# Patient Record
Sex: OTHER/UNKNOWN | URBAN - METROPOLITAN AREA
[De-identification: names, ages, dates, MRNs, and addresses within clinical notes are randomized per-mention and may not be internally consistent; named-entity substitution may affect disease eponyms.]

---
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
## 2024-01-13 ENCOUNTER — APPOINTMENT (EMERGENCY)
Dept: RADIOLOGY | Facility: HOSPITAL | Age: 19
End: 2024-01-13
Payer: COMMERCIAL

## 2024-01-13 ENCOUNTER — HOSPITAL ENCOUNTER (EMERGENCY)
Facility: HOSPITAL | Age: 19
Discharge: HOME | End: 2024-01-13
Attending: EMERGENCY MEDICINE
Payer: COMMERCIAL

## 2024-01-13 VITALS
TEMPERATURE: 98 F | BODY MASS INDEX: 20.76 KG/M2 | RESPIRATION RATE: 18 BRPM | WEIGHT: 137 LBS | SYSTOLIC BLOOD PRESSURE: 117 MMHG | HEART RATE: 74 BPM | OXYGEN SATURATION: 96 % | HEIGHT: 68 IN | DIASTOLIC BLOOD PRESSURE: 58 MMHG

## 2024-01-13 DIAGNOSIS — R50.9 FEVER, UNSPECIFIED FEVER CAUSE: ICD-10-CM

## 2024-01-13 DIAGNOSIS — J18.9 PNEUMONIA DUE TO INFECTIOUS ORGANISM, UNSPECIFIED LATERALITY, UNSPECIFIED PART OF LUNG: Primary | ICD-10-CM

## 2024-01-13 LAB
ALBUMIN SERPL-MCNC: 4.2 G/DL (ref 3.5–5.7)
ALP SERPL-CCNC: 44 IU/L (ref 34–125)
ALT SERPL-CCNC: 11 IU/L (ref 7–52)
ANION GAP SERPL CALC-SCNC: 11 MEQ/L (ref 3–15)
AST SERPL-CCNC: 22 IU/L (ref 13–39)
BASOPHILS # BLD: 0.04 K/UL (ref 0.01–0.1)
BASOPHILS NFR BLD: 0.5 %
BILIRUB SERPL-MCNC: 0.7 MG/DL (ref 0.3–1.2)
BUN SERPL-MCNC: 18 MG/DL (ref 7–25)
CALCIUM SERPL-MCNC: 9.5 MG/DL (ref 8.6–10.3)
CHLORIDE SERPL-SCNC: 102 MEQ/L (ref 98–107)
CO2 SERPL-SCNC: 22 MEQ/L (ref 21–31)
CREAT SERPL-MCNC: 1.4 MG/DL (ref 0.7–1.3)
DIFFERENTIAL METHOD BLD: ABNORMAL
EGFRCR SERPLBLD CKD-EPI 2021: >60 ML/MIN/1.73M*2
EOSINOPHIL # BLD: 0.01 K/UL (ref 0.04–0.54)
EOSINOPHIL NFR BLD: 0.1 %
ERYTHROCYTE [DISTWIDTH] IN BLOOD BY AUTOMATED COUNT: 11.3 % (ref 11.6–14.4)
FLUAV RNA SPEC QL NAA+PROBE: NEGATIVE
FLUBV RNA SPEC QL NAA+PROBE: NEGATIVE
GLUCOSE SERPL-MCNC: 109 MG/DL (ref 70–99)
HCT VFR BLDCO AUTO: 45.4 % (ref 40.1–51)
HGB BLD-MCNC: 15.7 G/DL (ref 13.7–17.5)
IMM GRANULOCYTES # BLD AUTO: 0.05 K/UL (ref 0–0.08)
IMM GRANULOCYTES NFR BLD AUTO: 0.6 %
LIPASE SERPL-CCNC: 66 U/L (ref 11–82)
LYMPHOCYTES # BLD: 1.04 K/UL (ref 1.2–3.5)
LYMPHOCYTES NFR BLD: 13.5 %
MCH RBC QN AUTO: 30.5 PG (ref 28–33.2)
MCHC RBC AUTO-ENTMCNC: 34.6 G/DL (ref 32.2–36.5)
MCV RBC AUTO: 88.3 FL (ref 83–98)
MONOCYTES # BLD: 0.82 K/UL (ref 0.3–1)
MONOCYTES NFR BLD: 10.6 %
NEUTROPHILS # BLD: 5.76 K/UL (ref 1.7–7)
NEUTS SEG NFR BLD: 74.7 %
NRBC BLD-RTO: 0 %
PDW BLD AUTO: 10.4 FL (ref 9.4–12.4)
PLATELET # BLD AUTO: 187 K/UL (ref 150–350)
POTASSIUM SERPL-SCNC: 4.5 MEQ/L (ref 3.5–5.1)
PROT SERPL-MCNC: 8.2 G/DL (ref 6–8.2)
RBC # BLD AUTO: 5.14 M/UL (ref 4.5–5.8)
RSV RNA SPEC QL NAA+PROBE: NEGATIVE
SARS-COV-2 RNA RESP QL NAA+PROBE: NEGATIVE
SODIUM SERPL-SCNC: 135 MEQ/L (ref 136–145)
WBC # BLD AUTO: 7.72 K/UL (ref 3.8–10.5)

## 2024-01-13 PROCEDURE — 96365 THER/PROPH/DIAG IV INF INIT: CPT | Mod: 59

## 2024-01-13 PROCEDURE — 85025 COMPLETE CBC W/AUTO DIFF WBC: CPT | Performed by: EMERGENCY MEDICINE

## 2024-01-13 PROCEDURE — 63700000 HC SELF-ADMINISTRABLE DRUG: Performed by: PHYSICIAN ASSISTANT

## 2024-01-13 PROCEDURE — 80053 COMPREHEN METABOLIC PANEL: CPT | Performed by: EMERGENCY MEDICINE

## 2024-01-13 PROCEDURE — 25800000 HC PHARMACY IV SOLUTIONS: Performed by: PHYSICIAN ASSISTANT

## 2024-01-13 PROCEDURE — 3E0337Z INTRODUCTION OF ELECTROLYTIC AND WATER BALANCE SUBSTANCE INTO PERIPHERAL VEIN, PERCUTANEOUS APPROACH: ICD-10-PCS | Performed by: EMERGENCY MEDICINE

## 2024-01-13 PROCEDURE — 83690 ASSAY OF LIPASE: CPT | Performed by: EMERGENCY MEDICINE

## 2024-01-13 PROCEDURE — 99284 EMERGENCY DEPT VISIT MOD MDM: CPT | Mod: 25,U5

## 2024-01-13 PROCEDURE — 3E033GC INTRODUCTION OF OTHER THERAPEUTIC SUBSTANCE INTO PERIPHERAL VEIN, PERCUTANEOUS APPROACH: ICD-10-PCS | Performed by: EMERGENCY MEDICINE

## 2024-01-13 PROCEDURE — 36415 COLL VENOUS BLD VENIPUNCTURE: CPT | Performed by: EMERGENCY MEDICINE

## 2024-01-13 PROCEDURE — 63600105 HC IODINE BASED CONTRAST: Mod: JZ | Performed by: PHYSICIAN ASSISTANT

## 2024-01-13 PROCEDURE — 96361 HYDRATE IV INFUSION ADD-ON: CPT | Mod: 59

## 2024-01-13 PROCEDURE — 3E03329 INTRODUCTION OF OTHER ANTI-INFECTIVE INTO PERIPHERAL VEIN, PERCUTANEOUS APPROACH: ICD-10-PCS | Performed by: EMERGENCY MEDICINE

## 2024-01-13 PROCEDURE — 96375 TX/PRO/DX INJ NEW DRUG ADDON: CPT | Mod: 59

## 2024-01-13 PROCEDURE — 63600000 HC DRUGS/DETAIL CODE: Mod: JZ | Performed by: PHYSICIAN ASSISTANT

## 2024-01-13 PROCEDURE — 63700000 HC SELF-ADMINISTRABLE DRUG

## 2024-01-13 PROCEDURE — 63700000 HC SELF-ADMINISTRABLE DRUG: Performed by: EMERGENCY MEDICINE

## 2024-01-13 PROCEDURE — G1004 CDSM NDSC: HCPCS

## 2024-01-13 PROCEDURE — 87637 SARSCOV2&INF A&B&RSV AMP PRB: CPT | Performed by: EMERGENCY MEDICINE

## 2024-01-13 RX ORDER — CEFUROXIME AXETIL 500 MG/1
500 TABLET ORAL 2 TIMES DAILY
Qty: 14 TABLET | Refills: 0 | Status: SHIPPED | OUTPATIENT
Start: 2024-01-13 | End: 2024-01-20

## 2024-01-13 RX ORDER — ONDANSETRON HYDROCHLORIDE 2 MG/ML
4 INJECTION, SOLUTION INTRAVENOUS ONCE
Status: COMPLETED | OUTPATIENT
Start: 2024-01-13 | End: 2024-01-13

## 2024-01-13 RX ORDER — ONDANSETRON 4 MG/1
4 TABLET, ORALLY DISINTEGRATING ORAL EVERY 8 HOURS PRN
Qty: 15 TABLET | Refills: 0 | Status: SHIPPED | OUTPATIENT
Start: 2024-01-13 | End: 2024-01-20

## 2024-01-13 RX ORDER — ACETAMINOPHEN 325 MG/1
975 TABLET ORAL ONCE
Status: COMPLETED | OUTPATIENT
Start: 2024-01-13 | End: 2024-01-13

## 2024-01-13 RX ORDER — AZITHROMYCIN 250 MG/1
500 TABLET, FILM COATED ORAL ONCE
Status: COMPLETED | OUTPATIENT
Start: 2024-01-13 | End: 2024-01-13

## 2024-01-13 RX ORDER — IBUPROFEN 600 MG/1
TABLET ORAL
Status: DISCONTINUED
Start: 2024-01-13 | End: 2024-01-14 | Stop reason: HOSPADM

## 2024-01-13 RX ORDER — IBUPROFEN 600 MG/1
600 TABLET ORAL ONCE
Status: COMPLETED | OUTPATIENT
Start: 2024-01-13 | End: 2024-01-13

## 2024-01-13 RX ORDER — AZITHROMYCIN 250 MG/1
250 TABLET, FILM COATED ORAL DAILY
Qty: 4 TABLET | Refills: 0 | Status: SHIPPED | OUTPATIENT
Start: 2024-01-14

## 2024-01-13 RX ORDER — IOPAMIDOL 755 MG/ML
100 INJECTION, SOLUTION INTRAVASCULAR
Status: COMPLETED | OUTPATIENT
Start: 2024-01-13 | End: 2024-01-13

## 2024-01-13 RX ADMIN — SODIUM CHLORIDE 1000 ML: 9 INJECTION, SOLUTION INTRAVENOUS at 20:47

## 2024-01-13 RX ADMIN — CEFTRIAXONE SODIUM 1 G: 1 INJECTION, POWDER, FOR SOLUTION INTRAMUSCULAR; INTRAVENOUS at 20:12

## 2024-01-13 RX ADMIN — ACETAMINOPHEN 975 MG: 325 TABLET ORAL at 20:47

## 2024-01-13 RX ADMIN — IOPAMIDOL 100 ML: 755 INJECTION, SOLUTION INTRAVENOUS at 18:51

## 2024-01-13 RX ADMIN — AZITHROMYCIN DIHYDRATE 500 MG: 250 TABLET ORAL at 20:12

## 2024-01-13 RX ADMIN — ONDANSETRON 4 MG: 2 INJECTION INTRAMUSCULAR; INTRAVENOUS at 17:51

## 2024-01-13 RX ADMIN — SODIUM CHLORIDE 1000 ML: 9 INJECTION, SOLUTION INTRAVENOUS at 17:51

## 2024-01-13 RX ADMIN — IBUPROFEN 600 MG: 600 TABLET, FILM COATED ORAL at 17:51

## 2024-01-13 ASSESSMENT — ENCOUNTER SYMPTOMS
NAUSEA: 1
FEVER: 1
NECK PAIN: 0
ARTHRALGIAS: 0
CHILLS: 0
VOMITING: 1
CHEST TIGHTNESS: 0
HEMATURIA: 0
FLANK PAIN: 0
WHEEZING: 0
WEAKNESS: 0
DIZZINESS: 0
HEADACHES: 0
ABDOMINAL DISTENTION: 0
TROUBLE SWALLOWING: 0
COLOR CHANGE: 0
DYSURIA: 0
STRIDOR: 0
ABDOMINAL PAIN: 1
SHORTNESS OF BREATH: 0
FATIGUE: 1
SEIZURES: 0
NUMBNESS: 0
BACK PAIN: 0
LIGHT-HEADEDNESS: 0
PSYCHIATRIC NEGATIVE: 1
AGITATION: 0
PALPITATIONS: 0
SORE THROAT: 0
DIARRHEA: 0
NECK STIFFNESS: 0
COUGH: 0
SPEECH DIFFICULTY: 0
EYE PAIN: 0

## 2024-01-13 NOTE — ED ATTESTATION NOTE
I have personally seen and examined the patient.  I reviewed and agree with physician assistant / nurse practitioner’s assessment and plan of care, with the following exceptions: None    I personally performed the key components of the encounter and provided a substantive portion of the care and medical decision making    My examination, assessment, and plan of care of Peter Floyd Jr. is as follows:     PT hasn't been feeling good for 1 week. Has been vomiting with diarrhea for the last 3 to 4 days.  Seen at , sent over  Exam: awake, alert. Abd soft, mild diffuse tenderness, no guarding.       James Flaherty MD  01/13/24 [id number]

## 2024-01-13 NOTE — ED PROVIDER NOTES
Emergency Medicine Note  HPI   HISTORY OF PRESENT ILLNESS     HPI   Patient is a 18-year-old male with no pertinent past medical history that is being sent to the emergency room by local urgent care center.  The patient reports to me he has had some nausea and intermittent episodes of vomiting small amount of coughing loss of appetite has not been feeling great for a week but specifically over the past 48 hours has been feeling worse admits to fevers patient went to local urgent care center he had an x-ray done of his chest as well as his abdomen.  They checked blood work that did not show anything acute they tested him for COVID flu and RSV and that was negative they had some concern with the way his abdominal x-ray looked and was sent over to the emergency room for evaluation.  Patient denies any sick contacts denies any recent traveling.  Patient has been taking some over-the-counter medications but has not had any Tylenol or Motrin today.  Patient came for evaluation.        Patient History   PAST HISTORY     Reviewed from Nursing Triage:       No past medical history on file.    No past surgical history on file.    No family history on file.           Review of Systems   REVIEW OF SYSTEMS     Review of Systems   Constitutional: Positive for fatigue and fever. Negative for chills.   HENT: Negative for sore throat and trouble swallowing.    Eyes: Negative for pain.   Respiratory: Negative for cough, chest tightness, shortness of breath, wheezing and stridor.    Cardiovascular: Negative for chest pain, palpitations and leg swelling.   Gastrointestinal: Positive for abdominal pain, nausea and vomiting. Negative for abdominal distention and diarrhea.   Genitourinary: Negative for dysuria, flank pain and hematuria.   Musculoskeletal: Negative for arthralgias, back pain, neck pain and neck stiffness.   Skin: Negative for color change.   Neurological: Negative for dizziness, seizures, syncope, speech difficulty,  weakness, light-headedness, numbness and headaches.   Psychiatric/Behavioral: Negative.  Negative for agitation.   All other systems reviewed and are negative.        VITALS     ED Vitals    Date/Time Temp Pulse Resp BP SpO2 [facility]   01/13/24 2135 -- 74 -- 117/58 96 % CK   01/13/24 2020 36.7 °C (98 °F) 76 -- 142/60 97 % CK   01/13/24 1719 38.8 °C (101.8 °F) 101 18 116/62 94 % LT        Pulse Ox %: 94 % (01/13/24 1742)  Pulse Ox Interpretation: Normal (01/13/24 1742)  Heart Rate: 101 (01/13/24 1742)  Rhythm Strip Interpretation: Sinus Tachycardia (01/13/24 1742)     Physical Exam   PHYSICAL EXAM     Physical Exam  Vitals and nursing note reviewed.   Constitutional:       Appearance: He is well-developed.   HENT:      Head: Normocephalic and atraumatic.   Cardiovascular:      Rate and Rhythm: Normal rate and regular rhythm.      Heart sounds: No murmur heard.  Pulmonary:      Effort: Pulmonary effort is normal. No respiratory distress.      Breath sounds: Normal breath sounds. No wheezing or rales.   Chest:      Chest wall: No tenderness.   Abdominal:      General: There is no distension.      Palpations: Abdomen is soft. There is no mass.      Tenderness: There is no abdominal tenderness. There is no guarding or rebound.   Skin:     General: Skin is warm and dry.   Neurological:      Mental Status: He is alert and oriented to person, place, and time.      Cranial Nerves: No cranial nerve deficit.      Sensory: No sensory deficit.      Motor: No abnormal muscle tone.      Coordination: Coordination normal.   Psychiatric:         Behavior: Behavior normal.           PROCEDURES     Procedures     DATA     Results     Procedure Component Value Units Date/Time    SARS-CoV-2 (COVID-19) Nasopharynx [[id number]]  (Normal) Collected: 01/13/24 1727    Specimen: Nasopharyngeal Swab from Nasopharynx Updated: 01/13/24 1819    Narrative:      The following orders were created for panel order SARS-CoV-2 (COVID-19)  Nasopharynx.  Procedure                               Abnormality         Status                     ---------                               -----------         ------                     SARS-COV-2 (COVID-19)/ F...[[id number]]  Normal              Final result                 Please view results for these tests on the individual orders.    SARS-COV-2 (COVID-19)/ FLU A/B, AND RSV, PCR Nasopharynx [[id number]]  (Normal) Collected: 01/13/24 1727    Specimen: Nasopharyngeal Swab from Nasopharynx Updated: 01/13/24 1819     SARS-CoV-2 (COVID-19) Negative     Influenza A Negative     Influenza B Negative     Respiratory Syncytial Virus Negative    Narrative:      Testing performed using real-time PCR for detection of COVID-19. EUA approved validation studies performed on site.     Comprehensive metabolic panel [[id number]]  (Abnormal) Collected: 01/13/24 1727    Specimen: Blood, Venous Updated: 01/13/24 1813     Sodium 135 mEQ/L      Potassium 4.5 mEQ/L      Comment: Results obtained on plasma. Plasma Potassium values may be up to 0.4 mEQ/L less than serum values. The differences may be greater for patients with high platelet or white cell counts.        Chloride 102 mEQ/L      CO2 22 mEQ/L      BUN 18 mg/dL      Creatinine 1.4 mg/dL      Glucose 109 mg/dL      Calcium 9.5 mg/dL      AST (SGOT) 22 IU/L      ALT (SGPT) 11 IU/L      Alkaline Phosphatase 44 IU/L      Total Protein 8.2 g/dL      Comment: Test performed on plasma which typically contains approximately 0.4 g/dL more protein than serum.        Albumin 4.2 g/dL      Bilirubin, Total 0.7 mg/dL      eGFR >60.0 mL/min/1.73m*2      Comment: Calculation based on the Chronic Kidney Disease Epidemiology Collaboration (CKD-EPI) equation refit without adjustment for race.        Anion Gap 11 mEQ/L     Lipase, if pain is above umbilicus [[id number]]  (Normal) Collected: 01/13/24 1727    Specimen: Blood, Venous Updated: 01/13/24 1813     Lipase 66 U/L     CBC and differential  [[id number]]  (Abnormal) Collected: 01/13/24 1727    Specimen: Blood, Venous Updated: 01/13/24 1742     WBC 7.72 K/uL      RBC 5.14 M/uL      Hemoglobin 15.7 g/dL      Hematocrit 45.4 %      MCV 88.3 fL      MCH 30.5 pg      MCHC 34.6 g/dL      RDW 11.3 %      Platelets 187 K/uL      MPV 10.4 fL      Differential Type Auto     nRBC 0.0 %      Immature Granulocytes 0.6 %      Neutrophils 74.7 %      Lymphocytes 13.5 %      Monocytes 10.6 %      Eosinophils 0.1 %      Basophils 0.5 %      Immature Granulocytes, Absolute 0.05 K/uL      Neutrophils, Absolute 5.76 K/uL      Lymphocytes, Absolute 1.04 K/uL      Monocytes, Absolute 0.82 K/uL      Eosinophils, Absolute 0.01 K/uL      Basophils, Absolute 0.04 K/uL           Imaging Results          CT ABDOMEN PELVIS WITH IV CONTRAST (Final result)  Result time 01/13/24 19:45:18    Final result                 Impression:    IMPRESSION:  1.  Multifocal opacities in the lower lung fields more so on the left which  could be related to aspiration pneumonitis or multifocal pneumonia.  2.  Very slight dilatation/gaseous distention of some loops of bowel in the left  midabdomen which is nonspecific, but not associated with any convincing evidence  of obstruction.  This could represent an element of mild enteritis, mild ileus  or just physiologic distention.  3.  Otherwise, no evidence of acute inflammatory or obstructive process in  abdomen or pelvis.    COMMENT:    Comparison: None    TECHNIQUE: CT abdomen and pelvis .  Examination was performed with intravenous contrast.  100mL of iopamidoL (ISOVUE-370) 370 mg iodine /mL (76 %) injection 100 mL    CT DOSE:  One or more dose reduction techniques (e.g. automated exposure  control, adjustment of the mA and/or kV according to patient size, use of  iterative reconstruction technique) utilized for this examination.    FINDINGS:  LOWER CHEST: Patchy areas of clustered groundglass nodularity and tree-in-bud  opacities throughout the  lingula, left lower lobe and minimally in the right  lower lobe worrisome for aspiration pneumonitis or multifocal pneumonia.    ABDOMEN:  LIVER: Top normal size of the liver.  Otherwise unremarkable.  BILE DUCTS: normal caliber.  GALLBLADDER: No calcified gallstones. Normal caliber wall.  PANCREAS: within normal limits.  SPLEEN: Spleen is slightly enlarged at 14.2 cm  ADRENALS: within normal limits.  KIDNEYS: Small right lower pole cyst is noted.  Otherwise unremarkable.    PELVIS:  REPRODUCTIVE ORGANS: no pelvic masses.  URETERS: within normal limits.  BLADDER: Incompletely distended limiting assessment.  Grossly within normal  limits      BOWEL: Terminal ileum is within normal limits.  Appendix appears normal.  There  is some very slight gaseous dilatation involving some loops of small bowel in  the left mid abdomen which is nonspecific.  This is not convincingly associated  with any transition point.  There is no convincing small bowel obstruction.  A  slightly dilated small bowel could be related to an element of mild ileus,  enteritis or perhaps just physiologic distention.  No enlarged mesenteric lymph  nodes.  PERITONEUM: no ascites or free air, no fluid collection.  VESSELS: Patency of major opacified vasculature with normal caliber of the  abdominal aorta.  RETROPERITONEUM: within normal limits.  ABDOMINAL WALL: within normal limits.  BONES: Skeletally immature patient.  No acute or suspicious bone findings..                 Narrative:    CLINICAL HISTORY: Nausea/vomiting   abd pain, N/V/D, decreased appetite, fevers.  thermaflu at 12                                No orders to display       Scoring tools                                  ED Course & MDM   MDM / ED COURSE / CLINICAL IMPRESSION / DISPO     MDM    ED Course as of 01/13/24 2208   Sat Jan 13, 2024 2207 Patient appears to have multifocal pneumonia on his CT of his abdomen got the bases of his lungs.  He has a normal white blood cell count  slight elevation his creatinine has been eating and drinking well.  Patient's temperature came down vitals are stable he feels better was given IV Rocephin and oral azithromycin which she kept down he was able to successfully p.o. challenge she feels better will be discharged home with 4 more days of azithromycin and a week of Ceftin.  Will follow-up with [facility] primary and/or [facility] and or [facility]  Patient and mother comfortable with the plan. [TK]      ED Course User Index  [TK] Curly Vital PA C     Clinical Impression      Pneumonia due to infectious organism, unspecified laterality, unspecified part of lung   Fever, unspecified fever cause     _________________     ED Disposition   Discharge                   Curly Vital PA C  01/13/24 7879

## 2024-01-14 NOTE — DISCHARGE INSTRUCTIONS
Please call and follow-up with your primary care doctor if you do not have 1 follow-up with your [facility] or [facility] make sure to take the antibiotics as prescribed and take the entire prescription until they are gone.  Continue Tylenol and/or Motrin for aches pains and fevers as well as the Zofran for any nausea and vomiting.  Return here if your symptoms were to change get worse or any other concerns.